# Patient Record
Sex: MALE | Race: WHITE | NOT HISPANIC OR LATINO | URBAN - METROPOLITAN AREA
[De-identification: names, ages, dates, MRNs, and addresses within clinical notes are randomized per-mention and may not be internally consistent; named-entity substitution may affect disease eponyms.]

---

## 2023-02-23 PROBLEM — S39.012A ACUTE LUMBAR MYOFASCIAL STRAIN: Status: ACTIVE | Noted: 2023-02-23

## 2023-02-23 PROBLEM — B00.1 RECURRENT COLD SORES: Status: ACTIVE | Noted: 2023-02-23

## 2023-02-23 PROBLEM — L25.9 CONTACT DERMATITIS: Status: ACTIVE | Noted: 2023-02-23

## 2023-02-23 RX ORDER — TRIAMCINOLONE ACETONIDE 1 MG/G
CREAM TOPICAL
COMMUNITY
Start: 2020-07-28

## 2023-02-23 RX ORDER — VALACYCLOVIR HYDROCHLORIDE 1 G/1
TABLET, FILM COATED ORAL
COMMUNITY
Start: 2019-05-31 | End: 2023-04-10 | Stop reason: SDUPTHER

## 2023-03-21 ENCOUNTER — APPOINTMENT (OUTPATIENT)
Dept: PRIMARY CARE | Facility: CLINIC | Age: 38
End: 2023-03-21

## 2023-04-10 ENCOUNTER — LAB (OUTPATIENT)
Dept: LAB | Facility: LAB | Age: 38
End: 2023-04-10
Payer: COMMERCIAL

## 2023-04-10 ENCOUNTER — OFFICE VISIT (OUTPATIENT)
Dept: PRIMARY CARE | Facility: CLINIC | Age: 38
End: 2023-04-10
Payer: COMMERCIAL

## 2023-04-10 VITALS
WEIGHT: 185 LBS | DIASTOLIC BLOOD PRESSURE: 68 MMHG | BODY MASS INDEX: 22.53 KG/M2 | HEART RATE: 94 BPM | HEIGHT: 76 IN | SYSTOLIC BLOOD PRESSURE: 114 MMHG

## 2023-04-10 DIAGNOSIS — Z12.11 SCREENING FOR COLON CANCER: ICD-10-CM

## 2023-04-10 DIAGNOSIS — Z11.59 ENCOUNTER FOR HEPATITIS C SCREENING TEST FOR LOW RISK PATIENT: ICD-10-CM

## 2023-04-10 DIAGNOSIS — Z00.00 ENCOUNTER FOR PREVENTIVE HEALTH EXAMINATION: Primary | ICD-10-CM

## 2023-04-10 DIAGNOSIS — Z00.00 ENCOUNTER FOR PREVENTIVE HEALTH EXAMINATION: ICD-10-CM

## 2023-04-10 DIAGNOSIS — B00.1 RECURRENT COLD SORES: ICD-10-CM

## 2023-04-10 PROBLEM — S39.012A ACUTE LUMBAR MYOFASCIAL STRAIN: Status: RESOLVED | Noted: 2023-02-23 | Resolved: 2023-04-10

## 2023-04-10 PROBLEM — L25.9 CONTACT DERMATITIS: Status: RESOLVED | Noted: 2023-02-23 | Resolved: 2023-04-10

## 2023-04-10 LAB
ALANINE AMINOTRANSFERASE (SGPT) (U/L) IN SER/PLAS: 27 U/L (ref 10–52)
ALBUMIN (G/DL) IN SER/PLAS: 4.4 G/DL (ref 3.4–5)
ALKALINE PHOSPHATASE (U/L) IN SER/PLAS: 55 U/L (ref 33–120)
ANION GAP IN SER/PLAS: 10 MMOL/L (ref 10–20)
ASPARTATE AMINOTRANSFERASE (SGOT) (U/L) IN SER/PLAS: 17 U/L (ref 9–39)
BILIRUBIN TOTAL (MG/DL) IN SER/PLAS: 0.8 MG/DL (ref 0–1.2)
CALCIUM (MG/DL) IN SER/PLAS: 8.8 MG/DL (ref 8.6–10.3)
CARBON DIOXIDE, TOTAL (MMOL/L) IN SER/PLAS: 23 MMOL/L (ref 21–32)
CHLORIDE (MMOL/L) IN SER/PLAS: 109 MMOL/L (ref 98–107)
CHOLESTEROL (MG/DL) IN SER/PLAS: 227 MG/DL (ref 0–199)
CHOLESTEROL IN HDL (MG/DL) IN SER/PLAS: 51.8 MG/DL
CHOLESTEROL/HDL RATIO: 4.4
CREATININE (MG/DL) IN SER/PLAS: 1.06 MG/DL (ref 0.5–1.3)
ERYTHROCYTE DISTRIBUTION WIDTH (RATIO) BY AUTOMATED COUNT: 13.2 % (ref 11.5–14.5)
ERYTHROCYTE MEAN CORPUSCULAR HEMOGLOBIN CONCENTRATION (G/DL) BY AUTOMATED: 33.5 G/DL (ref 32–36)
ERYTHROCYTE MEAN CORPUSCULAR VOLUME (FL) BY AUTOMATED COUNT: 97 FL (ref 80–100)
ERYTHROCYTES (10*6/UL) IN BLOOD BY AUTOMATED COUNT: 4.8 X10E12/L (ref 4.5–5.9)
GFR MALE: >90 ML/MIN/1.73M2
GLUCOSE (MG/DL) IN SER/PLAS: 84 MG/DL (ref 74–99)
HEMATOCRIT (%) IN BLOOD BY AUTOMATED COUNT: 46.6 % (ref 41–52)
HEMOGLOBIN (G/DL) IN BLOOD: 15.6 G/DL (ref 13.5–17.5)
LDL: 161 MG/DL (ref 0–99)
LEUKOCYTES (10*3/UL) IN BLOOD BY AUTOMATED COUNT: 6 X10E9/L (ref 4.4–11.3)
PLATELETS (10*3/UL) IN BLOOD AUTOMATED COUNT: 213 X10E9/L (ref 150–450)
POTASSIUM (MMOL/L) IN SER/PLAS: 4.2 MMOL/L (ref 3.5–5.3)
PROTEIN TOTAL: 6.6 G/DL (ref 6.4–8.2)
SODIUM (MMOL/L) IN SER/PLAS: 138 MMOL/L (ref 136–145)
THYROTROPIN (MIU/L) IN SER/PLAS BY DETECTION LIMIT <= 0.05 MIU/L: 1.71 MIU/L (ref 0.44–3.98)
TRIGLYCERIDE (MG/DL) IN SER/PLAS: 69 MG/DL (ref 0–149)
UREA NITROGEN (MG/DL) IN SER/PLAS: 17 MG/DL (ref 6–23)
VLDL: 14 MG/DL (ref 0–40)

## 2023-04-10 PROCEDURE — 80061 LIPID PANEL: CPT

## 2023-04-10 PROCEDURE — 80053 COMPREHEN METABOLIC PANEL: CPT

## 2023-04-10 PROCEDURE — 86803 HEPATITIS C AB TEST: CPT

## 2023-04-10 PROCEDURE — 85027 COMPLETE CBC AUTOMATED: CPT

## 2023-04-10 PROCEDURE — 4004F PT TOBACCO SCREEN RCVD TLK: CPT | Performed by: STUDENT IN AN ORGANIZED HEALTH CARE EDUCATION/TRAINING PROGRAM

## 2023-04-10 PROCEDURE — 99395 PREV VISIT EST AGE 18-39: CPT | Performed by: STUDENT IN AN ORGANIZED HEALTH CARE EDUCATION/TRAINING PROGRAM

## 2023-04-10 PROCEDURE — 84443 ASSAY THYROID STIM HORMONE: CPT

## 2023-04-10 PROCEDURE — 3008F BODY MASS INDEX DOCD: CPT | Performed by: STUDENT IN AN ORGANIZED HEALTH CARE EDUCATION/TRAINING PROGRAM

## 2023-04-10 PROCEDURE — 83036 HEMOGLOBIN GLYCOSYLATED A1C: CPT

## 2023-04-10 PROCEDURE — 36415 COLL VENOUS BLD VENIPUNCTURE: CPT

## 2023-04-10 RX ORDER — VALACYCLOVIR HYDROCHLORIDE 1 G/1
2000 TABLET, FILM COATED ORAL DAILY
Qty: 4 TABLET | Refills: 0 | Status: SHIPPED | OUTPATIENT
Start: 2023-04-10 | End: 2023-04-12

## 2023-04-10 ASSESSMENT — PATIENT HEALTH QUESTIONNAIRE - PHQ9
1. LITTLE INTEREST OR PLEASURE IN DOING THINGS: NOT AT ALL
2. FEELING DOWN, DEPRESSED OR HOPELESS: NOT AT ALL
SUM OF ALL RESPONSES TO PHQ9 QUESTIONS 1 AND 2: 0

## 2023-04-10 NOTE — PROGRESS NOTES
"Subjective   Patient ID: Ari Maldonado is a 37 y.o. male who presents for Annual Exam.    HPI  Diet is well balanced.  Exercises regularly.  Due for colon cancer screening. Has family history.  Vaccines are not up to date; they are due for: Tdap  Dental exam is up to date.  Vision exam is up to date.  Patient is fasting for labs today.    Review of Systems  All pertinent positive symptoms are included in the history of present illness.    All other systems have been reviewed and are negative and noncontributory to this patient's current ailments.     Social History     Tobacco Use    Smoking status: Some Days     Types: Cigarettes    Smokeless tobacco: Never   Vaping Use    Vaping status: Not on file   Substance Use Topics    Alcohol use: Not on file      Past Surgical History:   Procedure Laterality Date    OTHER SURGICAL HISTORY  05/31/2019    Inguinal hernia repair    OTHER SURGICAL HISTORY  05/31/2019    [place] tooth extraction    OTHER SURGICAL HISTORY  05/31/2019    Tonsillectomy      Allergies   Allergen Reactions    Cefaclor Unknown        Current Outpatient Medications   Medication Sig Dispense Refill    valACYclovir (Valtrex) 1 gram tablet TAKE 2 TABLETS TWICE DAILY X 1 DAY AT FIRST SIGN OF SYMPTOM ONSET.      triamcinolone (Kenalog) 0.1 % cream APPLY SMALL AMOUNT TO AFFECTED AREA(S) TWICE DAILY. NOT FOR FACE, ARM PITS OR GROIN.       No current facility-administered medications for this visit.        Patient Active Problem List   Diagnosis    Acute lumbar myofascial strain    Contact dermatitis    Recurrent cold sores        There is no immunization history on file for this patient.    Objective   VITALS  /68   Pulse 94   Ht 1.92 m (6' 3.59\")   Wt 83.9 kg (185 lb)   BMI 22.76 kg/m²      Physical Exam  CONSTITUTIONAL - well nourished, well developed, looks like stated age, in no acute distress, not ill-appearing, and not tired appearing  SKIN - normal skin color and pigmentation, normal skin " turgor without rash, lesions, or nodules visualized  HEAD - no trauma, normocephalic  EYES - pupils are equal and reactive to light, extraocular muscles are intact, and normal external exam  ENT - TM's intact, no injection, no signs of infection  NECK - supple without rigidity, no neck mass was observed, no thyromegaly or thyroid nodules  CHEST - clear to auscultation, no wheezing, no crackles and no rales, good effort  CARDIAC - regular rate and regular rhythm, no skipped beats, no murmur  ABDOMEN - no organomegaly, soft, nontender, nondistended, normal bowel sounds, no guarding/rebound/rigidity, negative McBurney sign and negative Staples sign  EXTREMITIES - no edema, no deformities  NEUROLOGICAL - normal gait, normal balance, normal motor, no ataxia, DTRs equal and symmetrical; alert, oriented and no focal signs  PSYCHIATRIC - alert, pleasant and cordial, age-appropriate  IMMUNOLOGIC - no cervical lymphadenopathy     Assessment/Plan   Diagnoses and all orders for this visit:  Encounter for preventive health examination  A complete history and physical was performed today  Fasting blood work ordered includes:  -     CBC; Future  -     Comprehensive Metabolic Panel; Future  -     Hemoglobin A1C; Future  -     Lipid Panel; Future  -     TSH with reflex to Free T4 if abnormal; Future  Screening for colon cancer in individual with family history of colon cancer  -     Colonoscopy; Future  Encounter for hepatitis C screening test for low risk patient  -     Hepatitis C Antibody; Future  BMI 22.0-22.9, adult  Continue a lot on diet with regular physical exercise  Recurrent cold sores  -     Refilled valACYclovir (Valtrex) 1 gram tablet; Take 2 tablets (2,000 mg) by mouth once daily for 2 days. TAKE 2 TABLETS TWICE DAILY X 1 DAY AT FIRST SIGN OF SYMPTOM ONSET.

## 2023-04-11 LAB
ESTIMATED AVERAGE GLUCOSE FOR HBA1C: 103 MG/DL
HEMOGLOBIN A1C/HEMOGLOBIN TOTAL IN BLOOD: 5.2 %
HEPATITIS C VIRUS AB PRESENCE IN SERUM: NONREACTIVE

## 2024-02-02 DIAGNOSIS — B00.1 HERPESVIRAL VESICULAR DERMATITIS: ICD-10-CM

## 2024-02-02 DIAGNOSIS — B00.1 RECURRENT COLD SORES: Primary | ICD-10-CM

## 2024-02-02 RX ORDER — VALACYCLOVIR HYDROCHLORIDE 1 G/1
1000 TABLET, FILM COATED ORAL 2 TIMES DAILY
COMMUNITY
End: 2024-02-02 | Stop reason: SDUPTHER

## 2024-02-02 RX ORDER — VALACYCLOVIR HYDROCHLORIDE 1 G/1
TABLET, FILM COATED ORAL
Qty: 16 TABLET | Refills: 1 | Status: SHIPPED | OUTPATIENT
Start: 2024-02-02

## 2024-02-02 RX ORDER — VALACYCLOVIR HYDROCHLORIDE 1 G/1
TABLET, FILM COATED ORAL
Qty: 4 TABLET | OUTPATIENT
Start: 2024-02-02

## 2024-06-07 ENCOUNTER — OFFICE VISIT (OUTPATIENT)
Dept: PRIMARY CARE | Facility: CLINIC | Age: 39
End: 2024-06-07
Payer: COMMERCIAL

## 2024-06-07 VITALS
WEIGHT: 187 LBS | SYSTOLIC BLOOD PRESSURE: 126 MMHG | HEART RATE: 88 BPM | BODY MASS INDEX: 23.25 KG/M2 | HEIGHT: 75 IN | DIASTOLIC BLOOD PRESSURE: 68 MMHG

## 2024-06-07 DIAGNOSIS — Z00.00 ENCOUNTER FOR PREVENTIVE HEALTH EXAMINATION: Primary | ICD-10-CM

## 2024-06-07 DIAGNOSIS — Z12.11 COLON CANCER SCREENING: ICD-10-CM

## 2024-06-07 PROCEDURE — 99396 PREV VISIT EST AGE 40-64: CPT | Performed by: STUDENT IN AN ORGANIZED HEALTH CARE EDUCATION/TRAINING PROGRAM

## 2024-06-07 ASSESSMENT — PATIENT HEALTH QUESTIONNAIRE - PHQ9
SUM OF ALL RESPONSES TO PHQ9 QUESTIONS 1 AND 2: 0
2. FEELING DOWN, DEPRESSED OR HOPELESS: NOT AT ALL
1. LITTLE INTEREST OR PLEASURE IN DOING THINGS: NOT AT ALL

## 2024-06-07 NOTE — PROGRESS NOTES
Subjective   Patient ID: Ari Maldonado is a 38 y.o. male who presents for Annual Exam.    Past Medical, Surgical, and Family History reviewed and updated in chart.    Reviewed all medications by prescribing practitioner or clinical pharmacist (such as prescriptions, OTCs, herbal therapies and supplements) and documented in the medical record.    HPI  1.  Physical exam.  Diet is well balanced.  Exercises regularly.  Paternal grandfather had colon cancer, he was interested in colon cancer screening during his annual physical last year so we ordered a colonoscopy but he reports that his insurance did not want to cover this so today he presents with a pamphlet for Cologuard and would like us to order this, he continues to be asymptomatic.  Vaccines are not up to date; they are due for: Tdap, declines.  Dental exam is up to date.  Vision exam is up to date.  Smokes 1/2 pack of cigarettes daily for the past 20 years.  Patient is fasting for labs today.    Review of Systems  All pertinent positive symptoms are included in the history of present illness.    All other systems have been reviewed and are negative and noncontributory to this patient's current ailments.    Past Medical History:   Diagnosis Date    Personal history of (healed) traumatic fracture     History of fracture of rib     Past Surgical History:   Procedure Laterality Date    OTHER SURGICAL HISTORY  05/31/2019    Inguinal hernia repair    OTHER SURGICAL HISTORY  05/31/2019    [place] tooth extraction    OTHER SURGICAL HISTORY  05/31/2019    Tonsillectomy     Social History     Tobacco Use    Smoking status: Every Day     Current packs/day: 0.50     Average packs/day: 0.5 packs/day for 20.4 years (10.2 ttl pk-yrs)     Types: Cigarettes     Start date: 1/1/2004    Smokeless tobacco: Never   Substance Use Topics    Alcohol use: Not Currently    Drug use: Never     Family History   Problem Relation Name Age of Onset    Other (colonic polyps) Father      Colon  "cancer Paternal Grandfather      Colon cancer Paternal Great-Grandfather         There is no immunization history on file for this patient.  Current Outpatient Medications   Medication Instructions    triamcinolone (Kenalog) 0.1 % cream APPLY SMALL AMOUNT TO AFFECTED AREA(S) TWICE DAILY. NOT FOR FACE, ARM PITS OR GROIN.    valACYclovir (Valtrex) 1 gram tablet TAKE 2 TABLETS TWICE DAILY X 1 DAY AT FIRST SIGN OF SYMPTOM ONSET.     Allergies   Allergen Reactions    Cefaclor Unknown       Objective   Vitals:    06/07/24 0734   BP: 126/68   Pulse: 88   Weight: 84.8 kg (187 lb)   Height: 1.905 m (6' 3\")     Body mass index is 23.37 kg/m².    BP Readings from Last 3 Encounters:   06/07/24 126/68   04/10/23 114/68   03/23/21 92/54      Wt Readings from Last 3 Encounters:   06/07/24 84.8 kg (187 lb)   04/10/23 83.9 kg (185 lb)   03/23/21 80.7 kg (178 lb)        No visits with results within 1 Month(s) from this visit.   Latest known visit with results is:   Lab on 04/10/2023   Component Date Value    WBC 04/10/2023 6.0     RBC 04/10/2023 4.80     Hemoglobin 04/10/2023 15.6     Hematocrit 04/10/2023 46.6     MCV 04/10/2023 97     MCHC 04/10/2023 33.5     Platelets 04/10/2023 213     RDW 04/10/2023 13.2     Glucose 04/10/2023 84     Sodium 04/10/2023 138     Potassium 04/10/2023 4.2     Chloride 04/10/2023 109 (H)     Bicarbonate 04/10/2023 23     Anion Gap 04/10/2023 10     Urea Nitrogen 04/10/2023 17     Creatinine 04/10/2023 1.06     GFR MALE 04/10/2023 >90     Calcium 04/10/2023 8.8     Albumin 04/10/2023 4.4     Alkaline Phosphatase 04/10/2023 55     Total Protein 04/10/2023 6.6     AST 04/10/2023 17     Total Bilirubin 04/10/2023 0.8     ALT (SGPT) 04/10/2023 27     Hemoglobin A1C 04/10/2023 5.2     Estimated Average Glucose 04/10/2023 103     Cholesterol 04/10/2023 227 (H)     HDL 04/10/2023 51.8     Cholesterol/HDL Ratio 04/10/2023 4.4     LDL 04/10/2023 161 (H)     VLDL 04/10/2023 14     Triglycerides 04/10/2023 69  "    TSH 04/10/2023 1.71     Hepatitis C Ab 04/10/2023 NONREACTIVE      Physical Exam  CONSTITUTIONAL - well nourished, well developed, looks like stated age, in no acute distress, not ill-appearing, and not tired appearing  SKIN - normal skin color and pigmentation, normal skin turgor without rash, lesions, or nodules visualized  HEAD - no trauma, normocephalic  CHEST - clear to auscultation, no wheezing, no crackles and no rales, good effort  CARDIAC - regular rate and regular rhythm, no skipped beats, no murmur  EXTREMITIES - no edema, no deformities  NEUROLOGICAL - normal gait, normal balance, normal motor  PSYCHIATRIC - alert, pleasant and cordial, age-appropriate     Assessment/Plan   Problem List Items Addressed This Visit       Encounter for preventive health examination - Primary     Complete history and physical examination was performed  Please consider updating your Tdap vaccine  We will notify of test results once available and make treatment recommendations accordingly         Relevant Orders    CBC    Comprehensive Metabolic Panel    Hemoglobin A1C    Lipid Panel    TSH with reflex to Free T4 if abnormal    Colon cancer screening     Order placed for a screening Cologuard secondary to your family history of colon cancer         Relevant Orders    Cologuard® colon cancer screening

## 2024-06-07 NOTE — ASSESSMENT & PLAN NOTE
Complete history and physical examination was performed  Please consider updating your Tdap vaccine  We will notify of test results once available and make treatment recommendations accordingly   Adequate: hears normal conversation without difficulty

## 2024-06-30 LAB — NONINV COLON CA DNA+OCC BLD SCRN STL QL: NEGATIVE

## 2025-05-27 ENCOUNTER — PATIENT MESSAGE (OUTPATIENT)
Dept: PRIMARY CARE | Facility: CLINIC | Age: 40
End: 2025-05-27
Payer: COMMERCIAL

## 2025-05-28 ENCOUNTER — OFFICE VISIT (OUTPATIENT)
Dept: PRIMARY CARE | Facility: CLINIC | Age: 40
End: 2025-05-28
Payer: COMMERCIAL

## 2025-05-28 VITALS
BODY MASS INDEX: 23.75 KG/M2 | HEART RATE: 90 BPM | OXYGEN SATURATION: 99 % | WEIGHT: 190 LBS | DIASTOLIC BLOOD PRESSURE: 70 MMHG | SYSTOLIC BLOOD PRESSURE: 114 MMHG

## 2025-05-28 DIAGNOSIS — R30.0 DYSURIA: Primary | ICD-10-CM

## 2025-05-28 DIAGNOSIS — R39.15 URINARY URGENCY: ICD-10-CM

## 2025-05-28 LAB
POC APPEARANCE, URINE: CLEAR
POC BILIRUBIN, URINE: NEGATIVE
POC BLOOD, URINE: NEGATIVE
POC COLOR, URINE: YELLOW
POC GLUCOSE, URINE: NEGATIVE MG/DL
POC KETONES, URINE: NEGATIVE MG/DL
POC LEUKOCYTES, URINE: NEGATIVE
POC NITRITE,URINE: NEGATIVE
POC PH, URINE: 6.5 PH
POC PROTEIN, URINE: NEGATIVE MG/DL
POC SPECIFIC GRAVITY, URINE: 1.01
POC UROBILINOGEN, URINE: 0.2 EU/DL

## 2025-05-28 PROCEDURE — 99213 OFFICE O/P EST LOW 20 MIN: CPT | Performed by: STUDENT IN AN ORGANIZED HEALTH CARE EDUCATION/TRAINING PROGRAM

## 2025-05-28 PROCEDURE — 81002 URINALYSIS NONAUTO W/O SCOPE: CPT | Performed by: STUDENT IN AN ORGANIZED HEALTH CARE EDUCATION/TRAINING PROGRAM

## 2025-05-28 RX ORDER — CIPROFLOXACIN 250 MG/1
250 TABLET, FILM COATED ORAL 2 TIMES DAILY
Qty: 10 TABLET | Refills: 0 | Status: SHIPPED | OUTPATIENT
Start: 2025-05-28 | End: 2025-06-02

## 2025-05-28 NOTE — PROGRESS NOTES
Ari Maldonado is a 39 y.o. year old male presenting for UTI       HPI:  He is complaining of 5 days of ongoing dysuria and urinary urgency.  He states that he will feel like he has to go to the bathroom really bad, only a little bit of urine will come out and it burns right at the end of his stream.  He feels like he is drinking plenty of fluids throughout the day, so he does not think that that is the issue.  He started taking d-mannose, which he thought was pretty helpful, but his symptoms have continued.  Has not had any hematuria, penile discharge, abdominal pain, fevers, nausea, vomiting, diarrhea, constipation.    ROS:   All pertinent positive symptoms are included in history of present illness.    All other systems have been reviewed and are negative and noncontributory to this patient's current ailments.    Current Medications[1]    OBJECTIVE  Visit Vitals  /70 (BP Location: Left arm, Patient Position: Sitting, BP Cuff Size: Adult)   Pulse 90   Wt 86.2 kg (190 lb)   SpO2 99%   BMI 23.75 kg/m²   Smoking Status Every Day   BSA 2.14 m²        Physical Exam:  GENERAL: Alert, oriented, pleasant, in no acute distress  HEENT: Head normocephalic, atraumatic  EXTREMITIES: no edema, no cyanosis  PSYCH: Appropriate mood and affect  SKIN: No rashes or lesions appreciated    Assessment/Plan   Diagnoses and all orders for this visit:  Dysuria/Urinary urgency  -     POCT UA (nonautomated) manually resulted  -     Urine Culture  In office urinalysis was unremarkable, but will send the urine out for culture just to make sure.  Due to his symptoms though and history of a UTI in the past, we will go ahead and send in Cipro at this time to treat a UTI just in case while we wait on the culture.  If he has any new or worsening symptoms, he should contact the office.                [1]   Current Outpatient Medications   Medication Sig Dispense Refill    triamcinolone (Kenalog) 0.1 % cream APPLY SMALL AMOUNT TO AFFECTED  AREA(S) TWICE DAILY. NOT FOR FACE, ARM PITS OR GROIN.      valACYclovir (Valtrex) 1 gram tablet TAKE 2 TABLETS TWICE DAILY X 1 DAY AT FIRST SIGN OF SYMPTOM ONSET. 16 tablet 1     No current facility-administered medications for this visit.

## 2025-05-30 DIAGNOSIS — R39.15 URINARY URGENCY: ICD-10-CM

## 2025-05-30 DIAGNOSIS — R30.0 DYSURIA: Primary | ICD-10-CM

## 2025-05-30 LAB — BACTERIA UR CULT: NORMAL

## 2025-07-01 NOTE — PROGRESS NOTES
Subjective   Patient ID: Ari Maldonado is a 40 y.o. male who presents for F/U ON SXS OF UTI THAT OCCURED OVER MEMORIAL WEEKEND. PT STARTED ON CIPRO -- URINE C & S WAS NEG.  PT HAD SXS ON AND OFF UNTIL JUNE 20 TH THEN RESOLVED.  THE SXS WERE PAIN AT THE TIP OF THE PENIS AND A THROBBING SENSATION IN THE S-P AREA AND THE CONSTANT NEED TO VOID.   NO FAMILY H/O PROSTATE CANCER.  HPI:  Are you experiencing:  Burning on urination -- NO  Pain on urination  -- NO  Urinary frequency -- NO  Urinary urgency -- AT THE BEGINNING OF THE YEAR--  RESOLVED   Urge incontinence -- NO  Urinary stress incontinence  -- NO  Number of pads used per day --NONE  Enuresis -- NO  Nocturia-- NO  Hematuria -- NO  Hesitancy -- NO  Post void fullness --  NO  Strength of your stream-- GOOD    ROS:  General-- No C/O fever or chills  Head-- No C/O Dizziness  Eyes-- NO  C/O blurry or double vision  Ears-- No C/O hearing loss  Neck-- Supple  Chest-- No C/O pain or discomfort  Lungs-- No C/O shortness of breath  Abdomen-- No C/O  pain or discomfort, No nausea or vomiting  Back-- No C/O back pain or discomfort  Extremities-- No C/O swelling or pain    OBJECTIVE  General-- well-developed, well-nourished in NAD  Head-- normal cephalic, atraumatic  Eyes-- PERRL, EOM'S FROM, no jaundice  Neck-- Supple, without masses  Chest-- Normal bony structure  Abdomen-- soft, non tender, liver spleen not palpable. No suprapubic masses.  Back-- no flank masses palpable, no CVA tenderness on palpation or percussion  Lymph nodes-- No inguinal lymphadenopathy noted  Prostate-- 1+, firm, smooth, non-tender, without nodules  Testis-- both down, non tender, without masses   Scrotum-- no hydrocele  Epididymis- no masses palpable  Extremities-- normal mass and strength for the pt's age  Neurological-- pt oriented x 3     URINALYSIS DIPSTICK-- WNL    PVR-- O ML    ASSESSMENT / PLAN  A:  SX'S OF BLADDER IRRITATION ? SECONDARY TO PASSAGE OF A SMALL STONE  PATHOPHYSIOLOGY OF THE  ABOVE DISCUSSED IN DETAIL  ALL QUESTIONS ANSWERED   P:  REASSURANCE, EDUCATION, SUPPORTIVE CARE RENDERED TO THE  PT   F/U ON AN OPEN DOOR POLICY  IF THE VOIDING SXS RETURN WILL GET A CAT SCAN OF THE PELVIS TO LOOK FOR A STONE AT THE [facility]  Jose Angel Umaña MD 07/01/25 1:42 PM

## 2025-07-03 ENCOUNTER — APPOINTMENT (OUTPATIENT)
Dept: UROLOGY | Facility: CLINIC | Age: 40
End: 2025-07-03
Payer: COMMERCIAL

## 2025-07-03 VITALS
DIASTOLIC BLOOD PRESSURE: 79 MMHG | HEIGHT: 75 IN | HEART RATE: 67 BPM | BODY MASS INDEX: 23.62 KG/M2 | WEIGHT: 190 LBS | SYSTOLIC BLOOD PRESSURE: 122 MMHG

## 2025-07-03 DIAGNOSIS — R39.15 URINARY URGENCY: ICD-10-CM

## 2025-07-03 DIAGNOSIS — R30.0 DYSURIA: ICD-10-CM

## 2025-07-03 LAB
POC APPEARANCE, URINE: CLEAR
POC BILIRUBIN, URINE: NEGATIVE
POC BLOOD, URINE: NEGATIVE
POC COLOR, URINE: YELLOW
POC GLUCOSE, URINE: NEGATIVE MG/DL
POC KETONES, URINE: NEGATIVE MG/DL
POC LEUKOCYTES, URINE: NEGATIVE
POC NITRITE,URINE: NEGATIVE
POC PH, URINE: 6.5 PH
POC PROTEIN, URINE: NEGATIVE MG/DL
POC SPECIFIC GRAVITY, URINE: 1.02
POC UROBILINOGEN, URINE: 0.2 EU/DL

## 2025-07-03 PROCEDURE — 99204 OFFICE O/P NEW MOD 45 MIN: CPT | Performed by: UROLOGY

## 2025-07-03 PROCEDURE — 3008F BODY MASS INDEX DOCD: CPT | Performed by: UROLOGY

## 2025-07-03 PROCEDURE — 81003 URINALYSIS AUTO W/O SCOPE: CPT | Performed by: UROLOGY

## 2025-07-03 NOTE — LETTER
July 4, 2025     Brit Butcher DO  [address]  [facility], [address]  [facility] [address]    Patient: Ari Maldonado   YOB: 1985   Date of Visit: 7/3/2025       Dear Dr. Brit Butcher DO:    Thank you for referring Ari Maldonado to me for evaluation. Below are my notes for this consultation.  If you have questions, please do not hesitate to call me. I look forward to following your patient along with you.       Sincerely,     Jose Angel Umaña MD      CC: No Recipients  ______________________________________________________________________________________    Subjective  Patient ID: Ari Maldonado is a 40 y.o. male who presents for F/U ON SXS OF UTI THAT OCCURED OVER MEMORIAL WEEKEND. PT STARTED ON CIPRO -- URINE C & S WAS NEG.  PT HAD SXS ON AND OFF UNTIL JUNE 20 TH THEN RESOLVED.  THE SXS WERE PAIN AT THE TIP OF THE PENIS AND A THROBBING SENSATION IN THE S-P AREA AND THE CONSTANT NEED TO VOID.   NO FAMILY H/O PROSTATE CANCER.  HPI:  Are you experiencing:  Burning on urination -- NO  Pain on urination  -- NO  Urinary frequency -- NO  Urinary urgency -- AT THE BEGINNING OF THE YEAR--  RESOLVED   Urge incontinence -- NO  Urinary stress incontinence  -- NO  Number of pads used per day --NONE  Enuresis -- NO  Nocturia-- NO  Hematuria -- NO  Hesitancy -- NO  Post void fullness --  NO  Strength of your stream-- GOOD    ROS:  General-- No C/O fever or chills  Head-- No C/O Dizziness  Eyes-- NO  C/O blurry or double vision  Ears-- No C/O hearing loss  Neck-- Supple  Chest-- No C/O pain or discomfort  Lungs-- No C/O shortness of breath  Abdomen-- No C/O  pain or discomfort, No nausea or vomiting  Back-- No C/O back pain or discomfort  Extremities-- No C/O swelling or pain    OBJECTIVE  General-- well-developed, well-nourished in NAD  Head-- normal cephalic, atraumatic  Eyes-- PERRL, EOM'S FROM, no jaundice  Neck-- Supple, without masses  Chest-- Normal bony structure  Abdomen-- soft,  non tender, liver spleen not palpable. No suprapubic masses.  Back-- no flank masses palpable, no CVA tenderness on palpation or percussion  Lymph nodes-- No inguinal lymphadenopathy noted  Prostate-- 1+, firm, smooth, non-tender, without nodules  Testis-- both down, non tender, without masses   Scrotum-- no hydrocele  Epididymis- no masses palpable  Extremities-- normal mass and strength for the pt's age  Neurological-- pt oriented x 3     URINALYSIS DIPSTICK-- WNL    PVR-- O ML    ASSESSMENT / PLAN  A:  SX'S OF BLADDER IRRITATION ? SECONDARY TO PASSAGE OF A SMALL STONE  PATHOPHYSIOLOGY OF THE ABOVE DISCUSSED IN DETAIL  ALL QUESTIONS ANSWERED   P:  REASSURANCE, EDUCATION, SUPPORTIVE CARE RENDERED TO THE  PT   F/U ON AN OPEN DOOR POLICY  IF THE VOIDING SXS RETURN WILL GET A CAT SCAN OF THE PELVIS TO LOOK FOR A STONE AT THE [facility]  Jose Angel Umaña MD 07/01/25 1:42 PM